# Patient Record
Sex: FEMALE | Race: WHITE | Employment: FULL TIME | URBAN - NONMETROPOLITAN AREA
[De-identification: names, ages, dates, MRNs, and addresses within clinical notes are randomized per-mention and may not be internally consistent; named-entity substitution may affect disease eponyms.]

---

## 2024-01-19 ENCOUNTER — HOSPITAL ENCOUNTER (EMERGENCY)
Age: 29
Discharge: HOME | End: 2024-01-19
Payer: COMMERCIAL

## 2024-01-19 VITALS
RESPIRATION RATE: 18 BRPM | WEIGHT: 180 LBS | SYSTOLIC BLOOD PRESSURE: 130 MMHG | TEMPERATURE: 97.3 F | DIASTOLIC BLOOD PRESSURE: 87 MMHG | BODY MASS INDEX: 25.2 KG/M2 | HEART RATE: 116 BPM | HEIGHT: 71 IN | OXYGEN SATURATION: 100 %

## 2024-01-19 DIAGNOSIS — K52.9 GASTROENTERITIS: Primary | ICD-10-CM

## 2024-01-19 PROCEDURE — 6370000000 HC RX 637 (ALT 250 FOR IP): Performed by: NURSE PRACTITIONER

## 2024-01-19 PROCEDURE — 99203 OFFICE O/P NEW LOW 30 MIN: CPT | Performed by: NURSE PRACTITIONER

## 2024-01-19 PROCEDURE — 99203 OFFICE O/P NEW LOW 30 MIN: CPT

## 2024-01-19 RX ORDER — ONDANSETRON 4 MG/1
4 TABLET, ORALLY DISINTEGRATING ORAL 3 TIMES DAILY PRN
Qty: 21 TABLET | Refills: 0 | Status: SHIPPED | OUTPATIENT
Start: 2024-01-19

## 2024-01-19 RX ORDER — ONDANSETRON 4 MG/1
4 TABLET, ORALLY DISINTEGRATING ORAL ONCE
Status: COMPLETED | OUTPATIENT
Start: 2024-01-19 | End: 2024-01-19

## 2024-01-19 RX ORDER — NORGESTIMATE AND ETHINYL ESTRADIOL 0.25-0.035
1 KIT ORAL DAILY
COMMUNITY

## 2024-01-19 RX ADMIN — ONDANSETRON 4 MG: 4 TABLET, ORALLY DISINTEGRATING ORAL at 14:56

## 2024-01-19 ASSESSMENT — ENCOUNTER SYMPTOMS
ABDOMINAL PAIN: 1
NAUSEA: 1
CONSTIPATION: 0
ANAL BLEEDING: 0
CHOKING: 0
VOMITING: 1
RECENT COUGH: 0
APNEA: 0
CHEST TIGHTNESS: 0
COUGH: 0
SORE THROAT: 0
SINUS PRESSURE: 0
WHEEZING: 0
STRIDOR: 0
DIARRHEA: 1
RECTAL PAIN: 0
SHORTNESS OF BREATH: 0
BLOOD IN STOOL: 0

## 2024-01-19 ASSESSMENT — PAIN - FUNCTIONAL ASSESSMENT: PAIN_FUNCTIONAL_ASSESSMENT: NONE - DENIES PAIN

## 2024-01-19 NOTE — ED TRIAGE NOTES
Pt with complaints of nausea, vomiting and diarrhea that started 3 days ago. States she has been taking Imodium which has helped. Denies any pain.

## 2024-01-19 NOTE — DISCHARGE INSTRUCTIONS
These symptoms are consistent with viral gastroenteritis. I recommend Clear Liquids only next 12-24 hours.If tolerated then BRAT Diet .  Advise the patient that if worsening symptoms such as more than 6 stools per day, not voiding regularly, persistent vomiting not relieved with medication,unable to take oral fluids, high fever, severe weakness, lightheadedness or fainting, dry mucous membranes or other signs of dehydration, persisting or increasing abdominal pain, blood in stool or vomit, or failure to improve in 1-2 days.   The patient needs to be reevaluated at that time by their primary care provider for a recheck, OR go the Emergency Department for reevaluation.   The patient or patient's representative are agreeable to the treatment plan and left ambulatory without any complaints at this time.

## 2024-01-19 NOTE — ED PROVIDER NOTES
[facility]  Urgent Care Encounter      CHIEF COMPLAINT       Chief Complaint   Patient presents with    Emesis    Nausea    Diarrhea       Nurses Notes reviewed and I agree except as noted in the HPI.  HISTORY OFPRESENT ILLNESS   Livier Drake is a 28 y.o.  The history is provided by the patient. No  was used.   Diarrhea  Quality:  Watery  Severity:  Moderate  Onset quality:  Sudden  Number of episodes:  3  Duration:  3 days  Timing:  Constant  Progression:  Unchanged  Relieved by:  Nothing  Worsened by:  Anti-motility medications  Ineffective treatments:  Change in diet and liquids  Associated symptoms: abdominal pain, headaches and vomiting    Associated symptoms: no arthralgias, no chills, no recent cough, no diaphoresis, no fever, no myalgias and no URI    Abdominal pain:     Location:  Suprapubic    Quality: cramping      Quality: not aching, not bloating, not burning, not dull, no fullness, not gnawing, not heavy, no pressure, not sharp, not shooting, not squeezing, not stabbing, no stiffness, not tearing, not throbbing and not tugging    Risk factors: no recent antibiotic use, no sick contacts, no suspicious food intake and no travel to endemic areas        REVIEW OF SYSTEMS     Review of Systems   Constitutional:  Positive for activity change, appetite change and fatigue. Negative for chills, diaphoresis and fever.   HENT:  Negative for congestion, ear pain, postnasal drip, sinus pressure and sore throat.    Respiratory:  Negative for apnea, cough, choking, chest tightness, shortness of breath, wheezing and stridor.    Cardiovascular:  Negative for chest pain, palpitations and leg swelling.   Gastrointestinal:  Positive for abdominal pain, diarrhea, nausea and vomiting. Negative for anal bleeding, blood in stool, constipation and rectal pain.   Musculoskeletal:  Negative for arthralgias and myalgias.   Neurological:  Positive for headaches.       PAST MEDICAL